# Patient Record
Sex: MALE | Race: WHITE | NOT HISPANIC OR LATINO | Employment: FULL TIME | URBAN - METROPOLITAN AREA
[De-identification: names, ages, dates, MRNs, and addresses within clinical notes are randomized per-mention and may not be internally consistent; named-entity substitution may affect disease eponyms.]

---

## 2023-04-27 LAB
ALPHA-1 FETOPROTEIN (NG/ML) IN SER/PLAS: 4 NG/ML (ref 0–9)
ANION GAP IN SER/PLAS: 11 MMOL/L (ref 10–20)
CALCIUM (MG/DL) IN SER/PLAS: 10.2 MG/DL (ref 8.6–10.6)
CARBON DIOXIDE, TOTAL (MMOL/L) IN SER/PLAS: 32 MMOL/L (ref 21–32)
CHLORIDE (MMOL/L) IN SER/PLAS: 104 MMOL/L (ref 98–107)
CREATININE (MG/DL) IN SER/PLAS: 0.86 MG/DL (ref 0.5–1.3)
ESTRADIOL (PG/ML) IN SER/PLAS: <19 PG/ML
GFR MALE: >90 ML/MIN/1.73M2
GLUCOSE (MG/DL) IN SER/PLAS: 79 MG/DL (ref 74–99)
HCG TUMOR MARKER: 4 IU/L
HEMATOCRIT (%) IN BLOOD BY AUTOMATED COUNT: 42.9 % (ref 41–52)
LUTEINIZING HORMONE (IU/ML) IN SER/PLAS: 55.5 IU/L
POTASSIUM (MMOL/L) IN SER/PLAS: 4.8 MMOL/L (ref 3.5–5.3)
PROSTATE SPECIFIC AG (NG/ML) IN SER/PLAS: 0.55 NG/ML (ref 0–4)
SODIUM (MMOL/L) IN SER/PLAS: 142 MMOL/L (ref 136–145)
TESTOSTERONE (NG/DL) IN SER/PLAS: 522 NG/DL (ref 240–1000)
UREA NITROGEN (MG/DL) IN SER/PLAS: 20 MG/DL (ref 6–23)

## 2023-05-16 LAB — CREATINE KINASE (U/L) IN SER/PLAS: 128 U/L (ref 0–325)

## 2023-05-17 LAB
ANTI-CENTROMERE: <0.2 AI
ANTI-CHROMATIN: <0.2 AI
ANTI-DNA (DS): <1 IU/ML
ANTI-JO-1 IGG: <0.2 AI
ANTI-NUCLEAR ANTIBODY (ANA): NEGATIVE
ANTI-RIBOSOMAL P: <0.2 AI
ANTI-RNP: <0.2 AI
ANTI-SCL-70: <0.2 AI
ANTI-SM/RNP: <0.2 AI
ANTI-SM: <0.2 AI
ANTI-SSA: <0.2 AI
ANTI-SSB: 0.2 AI

## 2023-05-19 LAB — ALDOLASE SERUM: 2.7 U/L (ref 1.2–7.6)

## 2023-09-01 LAB
ALANINE AMINOTRANSFERASE (SGPT) (U/L) IN SER/PLAS: 14 U/L (ref 10–52)
ALBUMIN (G/DL) IN SER/PLAS: 4.9 G/DL (ref 3.4–5)
ALKALINE PHOSPHATASE (U/L) IN SER/PLAS: 67 U/L (ref 33–120)
ANION GAP IN SER/PLAS: 15 MMOL/L (ref 10–20)
ASPARTATE AMINOTRANSFERASE (SGOT) (U/L) IN SER/PLAS: 19 U/L (ref 9–39)
BILIRUBIN TOTAL (MG/DL) IN SER/PLAS: 0.4 MG/DL (ref 0–1.2)
CALCIUM (MG/DL) IN SER/PLAS: 10.4 MG/DL (ref 8.6–10.6)
CARBON DIOXIDE, TOTAL (MMOL/L) IN SER/PLAS: 26 MMOL/L (ref 21–32)
CHLORIDE (MMOL/L) IN SER/PLAS: 106 MMOL/L (ref 98–107)
CREATININE (MG/DL) IN SER/PLAS: 0.94 MG/DL (ref 0.5–1.3)
GFR MALE: >90 ML/MIN/1.73M2
GLUCOSE (MG/DL) IN SER/PLAS: 88 MG/DL (ref 74–99)
POTASSIUM (MMOL/L) IN SER/PLAS: 3.8 MMOL/L (ref 3.5–5.3)
PROTEIN TOTAL: 7.3 G/DL (ref 6.4–8.2)
SODIUM (MMOL/L) IN SER/PLAS: 143 MMOL/L (ref 136–145)
UREA NITROGEN (MG/DL) IN SER/PLAS: 24 MG/DL (ref 6–23)

## 2023-09-02 LAB — HEPATITIS C VIRUS AB PRESENCE IN SERUM: NONREACTIVE

## 2023-09-14 LAB
ALPHA-1 FETOPROTEIN (NG/ML) IN SER/PLAS: 5 NG/ML (ref 0–9)
HCG TUMOR MARKER: 3 IU/L

## 2023-09-29 DIAGNOSIS — C62.90 MALIGNANT NEOPLASM OF TESTICLE, UNSPECIFIED LATERALITY, UNSPECIFIED WHETHER DESCENDED OR UNDESCENDED (MULTI): Primary | ICD-10-CM

## 2023-10-23 ENCOUNTER — LAB (OUTPATIENT)
Dept: LAB | Facility: LAB | Age: 43
End: 2023-10-23
Payer: COMMERCIAL

## 2023-10-23 DIAGNOSIS — E29.1 TESTICULAR HYPOFUNCTION: Primary | ICD-10-CM

## 2023-10-23 DIAGNOSIS — C62.90 MALIGNANT NEOPLASM OF TESTICLE, UNSPECIFIED LATERALITY, UNSPECIFIED WHETHER DESCENDED OR UNDESCENDED (MULTI): ICD-10-CM

## 2023-10-23 DIAGNOSIS — E29.1 TESTICULAR HYPOFUNCTION: ICD-10-CM

## 2023-10-23 LAB
ALBUMIN SERPL BCP-MCNC: 4.6 G/DL (ref 3.4–5)
ALP SERPL-CCNC: 61 U/L (ref 33–120)
ALT SERPL W P-5'-P-CCNC: 14 U/L (ref 10–52)
ANION GAP SERPL CALC-SCNC: 13 MMOL/L (ref 10–20)
AST SERPL W P-5'-P-CCNC: 16 U/L (ref 9–39)
BILIRUB SERPL-MCNC: 0.6 MG/DL (ref 0–1.2)
BUN SERPL-MCNC: 16 MG/DL (ref 6–23)
CALCIUM SERPL-MCNC: 9.9 MG/DL (ref 8.6–10.6)
CHLORIDE SERPL-SCNC: 104 MMOL/L (ref 98–107)
CO2 SERPL-SCNC: 29 MMOL/L (ref 21–32)
CREAT SERPL-MCNC: 0.81 MG/DL (ref 0.5–1.3)
ESTRADIOL SERPL-MCNC: <19 PG/ML
GFR SERPL CREATININE-BSD FRML MDRD: >90 ML/MIN/1.73M*2
GLUCOSE SERPL-MCNC: 64 MG/DL (ref 74–99)
HCT VFR BLD AUTO: 43.4 % (ref 41–52)
LH SERPL-ACNC: 52.5 IU/L
POTASSIUM SERPL-SCNC: 4.4 MMOL/L (ref 3.5–5.3)
PROT SERPL-MCNC: 6.9 G/DL (ref 6.4–8.2)
PSA SERPL-MCNC: 0.65 NG/ML
SODIUM SERPL-SCNC: 142 MMOL/L (ref 136–145)
TESTOST SERPL-MCNC: 415 NG/DL (ref 240–1000)

## 2023-10-23 PROCEDURE — 84153 ASSAY OF PSA TOTAL: CPT

## 2023-10-23 PROCEDURE — 85014 HEMATOCRIT: CPT

## 2023-10-23 PROCEDURE — 82670 ASSAY OF TOTAL ESTRADIOL: CPT

## 2023-10-23 PROCEDURE — 82310 ASSAY OF CALCIUM: CPT | Mod: CMCLAB | Performed by: INTERNAL MEDICINE

## 2023-10-23 PROCEDURE — 36415 COLL VENOUS BLD VENIPUNCTURE: CPT

## 2023-10-23 PROCEDURE — 83002 ASSAY OF GONADOTROPIN (LH): CPT

## 2023-10-23 PROCEDURE — 84403 ASSAY OF TOTAL TESTOSTERONE: CPT

## 2023-11-28 ENCOUNTER — TELEMEDICINE (OUTPATIENT)
Dept: UROLOGY | Facility: CLINIC | Age: 43
End: 2023-11-28
Payer: COMMERCIAL

## 2023-11-28 DIAGNOSIS — E29.1 HYPOGONADISM IN MALE: Primary | ICD-10-CM

## 2023-11-28 DIAGNOSIS — Z12.5 PROSTATE CANCER SCREENING: ICD-10-CM

## 2023-11-28 DIAGNOSIS — R79.89 LOW TESTOSTERONE IN MALE: ICD-10-CM

## 2023-11-28 PROCEDURE — 99214 OFFICE O/P EST MOD 30 MIN: CPT | Performed by: UROLOGY

## 2023-11-28 RX ORDER — TESTOSTERONE 20.25 MG/1.25G
4 GEL TOPICAL DAILY
Qty: 150 G | Refills: 5 | Status: SHIPPED | OUTPATIENT
Start: 2023-11-28

## 2023-11-28 NOTE — PROGRESS NOTES
Virtual or Telephone Consent    An interactive audio and video telecommunication system which permits real time communications between the patient (at the originating site) and provider (at the distant site) was utilized to provide this telehealth service.   Verbal consent was requested and obtained from Miguel Moreland on this date, 11/28/23 for a telehealth visit.     HPI  43 y.o.  with hx of right testicular cancer sp right orchiectomy in distant pass as well as chemotherapy with testicular mass seen on US. sp left orchiectomy 6/29/22, presenting for hypogonadism.    Last visit 05/22/23:   -continue 2 pumps to each shoulder daily  -Discussed insurance coverage with gels and that we do not do prior auths for gels /injections  -PSA  -Kathy Ca on surveillance with Dr. Rivas    Today's visit:   #Testicular mass  sp left orchiectomy 6/29/22 in conjunction with Dr. 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 Rivas  On surveillance for now  Not interested in testicular implant    Tumor markers 6/14/22: , HCG <3, Alpha FP 4    #Hypogonadism  -Using 2 pumps each shoulder currently.   -doing well on current regimen    HISTORY: None.    Lab Results   Component Value Date    TESTOSTERONE 415 10/23/2023    TESTOSTERONE 522 04/27/2023    TESTOSTERONE 296 05/03/2021     Lab Results   Component Value Date    LH 52.5 10/23/2023     Lab Results   Component Value Date    ESTRADIOL <19 10/23/2023     Lab Results   Component Value Date    PSA 0.65 10/23/2023     Lab Results   Component Value Date    GFRMALE >90 09/01/2023     Lab Results   Component Value Date    CREATININE 0.81 10/23/2023     Lab Results   Component Value Date    HCT 43.4 10/23/2023       Current Medications:  No current outpatient medications on file.     No current facility-administered medications for this visit.        PMH:  No past medical history on file.    PSH:  Past Surgical History:   Procedure Laterality  Date    OTHER SURGICAL HISTORY  03/09/2020    Lung wedge resection    OTHER SURGICAL HISTORY  03/09/2020    Orchiectomy radical       FMH:  No family history on file.    Allergies:  Not on File    Physical Exam   CONSTITUTIONAL:        No acute distress    HEAD:        Normocephalic and atraumatic    CHEST / RESPIRATORY      no excess work of breathing, no respiratory distress,    ABDOMEN / GASTROINTESTINAL:        Abdomen nondistended      Assessment/Plan  #Hypogonadism  -continue 2 pumps to each shoulder daily, refilled for 1yr today.    #Screening for polycythemia  -Hct  -prn phlebotomy for Hct>54    #Screening for hyperestrogenemia  -E2   -arimidex if E is elevated    #Screening for prostate cancer  -PSA    #Testicular cancer sp bilateral orchiectomy  -on surveillance with Dr. Evelyn sparks in 1yr with low T FU labs       Scribe Attestation  By signing my name below, I, Mónica Romero , Scribe   attest that this documentation has been prepared under the direction and in the presence of Clarita Ewing MD.

## 2024-02-27 ENCOUNTER — LAB (OUTPATIENT)
Dept: LAB | Facility: LAB | Age: 44
End: 2024-02-27
Payer: COMMERCIAL

## 2024-02-27 DIAGNOSIS — N50.89 OTHER SPECIFIED DISORDERS OF THE MALE GENITAL ORGANS: ICD-10-CM

## 2024-02-27 DIAGNOSIS — Z12.5 PROSTATE CANCER SCREENING: ICD-10-CM

## 2024-02-27 DIAGNOSIS — E29.1 HYPOGONADISM IN MALE: ICD-10-CM

## 2024-02-27 DIAGNOSIS — C80.1 MALIGNANT (PRIMARY) NEOPLASM, UNSPECIFIED (MULTI): Primary | ICD-10-CM

## 2024-02-27 LAB
AFP SERPL-MCNC: 4 NG/ML (ref 0–9)
ALBUMIN SERPL BCP-MCNC: 4.6 G/DL (ref 3.4–5)
ALP SERPL-CCNC: 57 U/L (ref 33–120)
ALT SERPL W P-5'-P-CCNC: 13 U/L (ref 10–52)
ANION GAP SERPL CALC-SCNC: 11 MMOL/L (ref 10–20)
AST SERPL W P-5'-P-CCNC: 16 U/L (ref 9–39)
B-HCG SERPL-ACNC: 4 MIU/ML
BASOPHILS # BLD AUTO: 0.03 X10*3/UL (ref 0–0.1)
BASOPHILS NFR BLD AUTO: 0.5 %
BILIRUB SERPL-MCNC: 0.5 MG/DL (ref 0–1.2)
BUN SERPL-MCNC: 15 MG/DL (ref 6–23)
CALCIUM SERPL-MCNC: 9.9 MG/DL (ref 8.6–10.6)
CHLORIDE SERPL-SCNC: 104 MMOL/L (ref 98–107)
CO2 SERPL-SCNC: 30 MMOL/L (ref 21–32)
CREAT SERPL-MCNC: 0.78 MG/DL (ref 0.5–1.3)
EGFRCR SERPLBLD CKD-EPI 2021: >90 ML/MIN/1.73M*2
EOSINOPHIL # BLD AUTO: 0.06 X10*3/UL (ref 0–0.7)
EOSINOPHIL NFR BLD AUTO: 0.9 %
ERYTHROCYTE [DISTWIDTH] IN BLOOD BY AUTOMATED COUNT: 12.8 % (ref 11.5–14.5)
ESTRADIOL SERPL-MCNC: 23 PG/ML
GLUCOSE SERPL-MCNC: 87 MG/DL (ref 74–99)
HCT VFR BLD AUTO: 41 % (ref 41–52)
HGB BLD-MCNC: 13.8 G/DL (ref 13.5–17.5)
IMM GRANULOCYTES # BLD AUTO: 0.01 X10*3/UL (ref 0–0.7)
IMM GRANULOCYTES NFR BLD AUTO: 0.2 % (ref 0–0.9)
LDH SERPL L TO P-CCNC: 124 U/L (ref 84–246)
LH SERPL-ACNC: 45 IU/L
LYMPHOCYTES # BLD AUTO: 2.05 X10*3/UL (ref 1.2–4.8)
LYMPHOCYTES NFR BLD AUTO: 32.2 %
MCH RBC QN AUTO: 30.2 PG (ref 26–34)
MCHC RBC AUTO-ENTMCNC: 33.7 G/DL (ref 32–36)
MCV RBC AUTO: 90 FL (ref 80–100)
MONOCYTES # BLD AUTO: 0.48 X10*3/UL (ref 0.1–1)
MONOCYTES NFR BLD AUTO: 7.5 %
NEUTROPHILS # BLD AUTO: 3.73 X10*3/UL (ref 1.2–7.7)
NEUTROPHILS NFR BLD AUTO: 58.7 %
NRBC BLD-RTO: 0 /100 WBCS (ref 0–0)
PLATELET # BLD AUTO: 223 X10*3/UL (ref 150–450)
POTASSIUM SERPL-SCNC: 4.1 MMOL/L (ref 3.5–5.3)
PROT SERPL-MCNC: 6.7 G/DL (ref 6.4–8.2)
PSA SERPL-MCNC: 0.73 NG/ML
RBC # BLD AUTO: 4.57 X10*6/UL (ref 4.5–5.9)
SODIUM SERPL-SCNC: 141 MMOL/L (ref 136–145)
TESTOST SERPL-MCNC: 242 NG/DL (ref 240–1000)
WBC # BLD AUTO: 6.4 X10*3/UL (ref 4.4–11.3)

## 2024-02-27 PROCEDURE — 83615 LACTATE (LD) (LDH) ENZYME: CPT

## 2024-02-27 PROCEDURE — 36415 COLL VENOUS BLD VENIPUNCTURE: CPT

## 2024-02-27 PROCEDURE — 80053 COMPREHEN METABOLIC PANEL: CPT

## 2024-02-27 PROCEDURE — 84702 CHORIONIC GONADOTROPIN TEST: CPT

## 2024-02-27 PROCEDURE — 84153 ASSAY OF PSA TOTAL: CPT

## 2024-02-27 PROCEDURE — 84403 ASSAY OF TOTAL TESTOSTERONE: CPT

## 2024-02-27 PROCEDURE — 83002 ASSAY OF GONADOTROPIN (LH): CPT

## 2024-02-27 PROCEDURE — 82105 ALPHA-FETOPROTEIN SERUM: CPT

## 2024-02-27 PROCEDURE — 85025 COMPLETE CBC W/AUTO DIFF WBC: CPT

## 2024-02-27 PROCEDURE — 82670 ASSAY OF TOTAL ESTRADIOL: CPT

## 2024-03-11 PROBLEM — D22.60 MELANOCYTIC NEVI OF UNSPECIFIED UPPER LIMB, INCLUDING SHOULDER: Status: ACTIVE | Noted: 2023-05-02

## 2024-03-11 PROBLEM — N50.89 TESTICULAR MASS: Status: ACTIVE | Noted: 2024-03-11

## 2024-03-11 PROBLEM — D48.5 NEOPLASM OF UNCERTAIN BEHAVIOR OF SKIN: Status: ACTIVE | Noted: 2023-05-02

## 2024-03-11 PROBLEM — D22.4 MELANOCYTIC NEVI OF SCALP AND NECK: Status: ACTIVE | Noted: 2023-05-02

## 2024-03-11 PROBLEM — D22.39 MELANOCYTIC NEVI OF OTHER PARTS OF FACE: Status: ACTIVE | Noted: 2023-05-02

## 2024-03-11 PROBLEM — L57.9 SKIN CHANGES DUE TO CHRONIC EXPOSURE TO NONIONIZING RADIATION, UNSPECIFIED: Status: ACTIVE | Noted: 2023-05-02

## 2024-03-11 PROBLEM — D22.70 MELANOCYTIC NEVI OF UNSPECIFIED LOWER LIMB, INCLUDING HIP: Status: ACTIVE | Noted: 2023-05-02

## 2024-03-11 PROBLEM — I86.1 VARICOCELE: Status: ACTIVE | Noted: 2024-03-11

## 2024-03-11 PROBLEM — L82.0 INFLAMED SEBORRHEIC KERATOSIS: Status: ACTIVE | Noted: 2017-12-04

## 2024-03-11 PROBLEM — K13.79 LESION OF PALATE: Status: ACTIVE | Noted: 2024-03-11

## 2024-03-11 PROBLEM — L73.8 OTHER SPECIFIED FOLLICULAR DISORDERS: Status: ACTIVE | Noted: 2023-05-02

## 2024-03-11 PROBLEM — K76.9 LESION OF LIVER: Status: ACTIVE | Noted: 2024-03-11

## 2024-03-11 PROBLEM — D22.5 MELANOCYTIC NEVI OF TRUNK: Status: ACTIVE | Noted: 2017-12-04

## 2024-03-11 RX ORDER — ASCORBIC ACID 500 MG
500 TABLET,CHEWABLE ORAL DAILY
COMMUNITY
Start: 2020-06-22 | End: 2024-03-12 | Stop reason: WASHOUT

## 2024-03-11 RX ORDER — ACETAMINOPHEN 325 MG/1
3 TABLET ORAL EVERY 6 HOURS
COMMUNITY
Start: 2022-09-09 | End: 2024-03-12 | Stop reason: WASHOUT

## 2024-03-11 NOTE — PROGRESS NOTES
Patient ID: Miguel Moreland is a 43 y.o. male.    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 nevus of skin - neg autoimmune work up    Subjective    HPI  Seen in follow up for his testicular cancer.   Energy and appetite okay.   Denies cough/sob.   Denies n/v/abd pain.   Denies lumps/bumps in groin.   Denies pain that is new or unusual or worsening/persistent.     Objective    BSA: 1.81 meters squared  /67   Pulse 63   Temp 36.6 °C (97.9 °F)   Resp 18   Wt 66.5 kg (146 lb 9.7 oz)   SpO2 100%   BMI 21.04 kg/m²      Physical Exam  Vitals reviewed.   Constitutional:       General: He is not in acute distress.  Eyes:      General: No scleral icterus.  Cardiovascular:      Rate and Rhythm: Normal rate and regular rhythm.      Pulses: Normal pulses.      Heart sounds: Normal heart sounds.   Pulmonary:      Effort: Pulmonary effort is normal. No respiratory distress.      Breath sounds: Normal breath sounds. No wheezing or rales.   Abdominal:      General:  Bowel sounds are normal. There is no distension.      Palpations: Abdomen is soft. There is no mass.      Tenderness: There is no abdominal tenderness. There is no guarding.      Comments: Tattoos on abdomen   (On R side pet's names)   Musculoskeletal:      Right lower leg: No edema.      Left lower leg: No edema.   Lymphadenopathy:      Cervical: No cervical adenopathy.      Right cervical: No superficial cervical adenopathy.     Left cervical: No superficial cervical adenopathy.      Upper Body:      Right upper body: No supraclavicular or axillary adenopathy.      Left upper body: No supraclavicular or axillary adenopathy.      Lower Body: No right inguinal adenopathy. No left inguinal adenopathy.      Comments: No adenopathy head/neck/axilla    Skin:     General: Skin is warm and dry.   Neurological:      Mental Status: He is alert and oriented to person, place, and time.   Psychiatric:         Mood and Affect: Mood normal.         Behavior: Behavior normal.         Performance Status:  Asymptomatic    AFP 4,  WNL, HCG 4    Lab Results   Component Value Date    PSA 0.73 02/27/2024    PSA 0.65 10/23/2023    PSA 0.55 04/27/2023     Lab Results   Component Value Date    WBC 6.4 02/27/2024    HGB 13.8 02/27/2024    HCT 41.0 02/27/2024    MCV 90 02/27/2024     02/27/2024     Lab Results   Component Value Date    GLUCOSE 87 02/27/2024    CALCIUM 9.9 02/27/2024     02/27/2024    K 4.1 02/27/2024    CO2 30 02/27/2024     02/27/2024    BUN 15 02/27/2024    CREATININE 0.78 02/27/2024     Lab Results   Component Value Date    TESTOSTERONE 242 02/27/2024     Lab Results   Component Value Date    ALT 13 02/27/2024    AST 16 02/27/2024    ALKPHOS 57 02/27/2024    BILITOT 0.5 02/27/2024      Assessment/Plan   AFP 4. LDH WNL. HCG 4.   Feeling well.   CXR today.   Follow up with Dr. Lewis in 6 mos with labs prior.   Encouraged patient to establish with a PCP for health maintenance/screenings.   He  follows with Dr. Ewing annually for hypogonadism treatment. Last appt was 11/2023.          Diagnoses and all orders for this visit:  Malignant neoplasm of testicle, unspecified laterality, unspecified whether descended or undescended (CMS/HCC)  -     XR chest 2 views; Future  -     Clinic Appointment Request Follow up; [facility] (at [facility]); Future  -     CBC and Auto Differential; Future  -     Comprehensive Metabolic Panel; Future  -     AFP tumor marker; Future  -     Human Chorionic Gonadotropin; Future  -     Lactate dehydrogenase; Future           DANTE Eric-CNP

## 2024-03-12 ENCOUNTER — HOSPITAL ENCOUNTER (OUTPATIENT)
Dept: RADIOLOGY | Facility: CLINIC | Age: 44
Discharge: HOME | End: 2024-03-12
Payer: COMMERCIAL

## 2024-03-12 ENCOUNTER — OFFICE VISIT (OUTPATIENT)
Dept: HEMATOLOGY/ONCOLOGY | Facility: CLINIC | Age: 44
End: 2024-03-12
Payer: COMMERCIAL

## 2024-03-12 VITALS
RESPIRATION RATE: 18 BRPM | HEART RATE: 63 BPM | SYSTOLIC BLOOD PRESSURE: 106 MMHG | TEMPERATURE: 97.9 F | BODY MASS INDEX: 21.04 KG/M2 | OXYGEN SATURATION: 100 % | WEIGHT: 146.61 LBS | DIASTOLIC BLOOD PRESSURE: 67 MMHG

## 2024-03-12 DIAGNOSIS — C62.90 MALIGNANT NEOPLASM OF TESTICLE, UNSPECIFIED LATERALITY, UNSPECIFIED WHETHER DESCENDED OR UNDESCENDED (MULTI): ICD-10-CM

## 2024-03-12 DIAGNOSIS — C62.90 MALIGNANT NEOPLASM OF TESTICLE, UNSPECIFIED LATERALITY, UNSPECIFIED WHETHER DESCENDED OR UNDESCENDED (MULTI): Primary | ICD-10-CM

## 2024-03-12 PROCEDURE — 99214 OFFICE O/P EST MOD 30 MIN: CPT | Performed by: NURSE PRACTITIONER

## 2024-03-12 PROCEDURE — 71046 X-RAY EXAM CHEST 2 VIEWS: CPT

## 2024-03-12 PROCEDURE — 71046 X-RAY EXAM CHEST 2 VIEWS: CPT | Performed by: RADIOLOGY

## 2024-03-12 ASSESSMENT — PAIN SCALES - GENERAL: PAINLEVEL: 0-NO PAIN

## 2024-04-30 ENCOUNTER — LAB REQUISITION (OUTPATIENT)
Dept: LAB | Facility: HOSPITAL | Age: 44
End: 2024-04-30
Payer: COMMERCIAL

## 2024-04-30 ENCOUNTER — TELEMEDICINE (OUTPATIENT)
Dept: UROLOGY | Facility: CLINIC | Age: 44
End: 2024-04-30
Payer: COMMERCIAL

## 2024-04-30 DIAGNOSIS — R10.9 FLANK PAIN: Primary | ICD-10-CM

## 2024-04-30 DIAGNOSIS — R30.0 DYSURIA: ICD-10-CM

## 2024-04-30 DIAGNOSIS — R39.15 URGENCY OF URINATION: ICD-10-CM

## 2024-04-30 PROCEDURE — 99213 OFFICE O/P EST LOW 20 MIN: CPT | Performed by: UROLOGY

## 2024-04-30 PROCEDURE — 87086 URINE CULTURE/COLONY COUNT: CPT

## 2024-04-30 PROCEDURE — G2211 COMPLEX E/M VISIT ADD ON: HCPCS | Performed by: UROLOGY

## 2024-04-30 NOTE — PROGRESS NOTES
Virtual or Telephone Consent    An interactive audio and video telecommunication system which permits real time communications between the patient (at the originating site) and provider (at the distant site) was utilized to provide this telehealth service.   Verbal consent was requested and obtained from Miguel ROMINA Moreland on this date, 04/30/24 for a telehealth visit.     HPI  43 y.o.  with hx of right testicular cancer sp right orchiectomy in distant pass as well as chemotherapy with testicular mass seen on US. sp left orchiectomy 6/29/22, presenting for hypogonadism.    Last visit 05/22/23:   -continue 2 pumps to each shoulder daily, refilled for 1yr today.  -testicular cancer on surveillance with Dr. Rivas    Today's visit:  #Flank pain / dysuria  -reports recent bothersome flank/ lower abdominal pain and pressure  -no hx of stones  -denies gross hematuria, n/v/f/c    #Testicular mass  sp left orchiectomy 6/29/22 in conjunction with Dr. 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 Rivas  On surveillance for now  Not interested in testicular implant    Tumor markers 6/14/22: , HCG <3, Alpha FP 4    #Hypogonadism  -Using 2 pumps each shoulder currently.   -doing well on current regimen    HISTORY: None.    Lab Results   Component Value Date    TESTOSTERONE 242 02/27/2024    TESTOSTERONE 415 10/23/2023    TESTOSTERONE 522 04/27/2023     Lab Results   Component Value Date    LH 45.0 02/27/2024     Lab Results   Component Value Date    ESTRADIOL 23 02/27/2024     Lab Results   Component Value Date    PSA 0.73 02/27/2024     Lab Results   Component Value Date    GFRMALE >90 09/01/2023     Lab Results   Component Value Date    CREATININE 0.78 02/27/2024     Lab Results   Component Value Date    HCT 41.0 02/27/2024       Current Medications:  Current Outpatient Medications   Medication Sig Dispense Refill    testosterone 20.25 mg/1.25 gram (1.62 %) gel in metered-dose pump  Place 4 Pump on the skin once daily. 2 pumps to each shoulder 150 g 5     No current facility-administered medications for this visit.        PMH:  No past medical history on file.    PSH:  Past Surgical History:   Procedure Laterality Date    OTHER SURGICAL HISTORY  03/09/2020    Lung wedge resection    OTHER SURGICAL HISTORY  03/09/2020    Orchiectomy radical       FMH:  No family history on file.    Allergies:  No Known Allergies    Physical Exam   CONSTITUTIONAL:        No acute distress    HEAD:        Normocephalic and atraumatic    CHEST / RESPIRATORY      no excess work of breathing, no respiratory distress,    ABDOMEN / GASTROINTESTINAL:        Abdomen nondistended      Assessment/Plan  #Hypogonadism  -continue 2 pumps to each shoulder daily, refilled for 1yr today.  Low t fu labs now    #Screening for polycythemia  -Hct  -prn phlebotomy for Hct>54    #Screening for hyperestrogenemia  -E2   -arimidex if E is elevated    #Screening for prostate cancer  -PSA    #Testicular cancer sp bilateral orchiectomy  -on surveillance with Dr. Rivas    #Flank pain / dysuria   -CT noncontrast now   Urine cx  -warning signs reviewed    fu after CT    G 2211  Visit complexity inherent to evaluation and management (E&M) associated with medical care services that serve as the continuing focal point for all needed health care services and/or with medical care services that are part of ongoing care related to a patient's single, serious condition or a complex condition.     Scribe Attestation  By signing my name below, Mónica QUEEN Scribe, attest that this documentation  has been prepared under the direction and in the presence of Clarita Ewing MD.

## 2024-05-01 DIAGNOSIS — R30.0 DYSURIA: ICD-10-CM

## 2024-05-01 DIAGNOSIS — R10.9 FLANK PAIN: ICD-10-CM

## 2024-05-02 DIAGNOSIS — R10.9 FLANK PAIN: ICD-10-CM

## 2024-05-02 DIAGNOSIS — R30.0 DYSURIA: ICD-10-CM

## 2024-05-02 LAB — BACTERIA UR CULT: NO GROWTH

## 2024-05-03 ENCOUNTER — LAB (OUTPATIENT)
Dept: LAB | Facility: LAB | Age: 44
End: 2024-05-03
Payer: COMMERCIAL

## 2024-05-03 ENCOUNTER — HOSPITAL ENCOUNTER (OUTPATIENT)
Dept: RADIOLOGY | Facility: CLINIC | Age: 44
Discharge: HOME | End: 2024-05-03
Payer: COMMERCIAL

## 2024-05-03 DIAGNOSIS — R30.0 DYSURIA: ICD-10-CM

## 2024-05-03 DIAGNOSIS — R10.9 FLANK PAIN: ICD-10-CM

## 2024-05-03 DIAGNOSIS — Z12.5 PROSTATE CANCER SCREENING: ICD-10-CM

## 2024-05-03 DIAGNOSIS — E29.1 HYPOGONADISM IN MALE: ICD-10-CM

## 2024-05-03 LAB
ESTRADIOL SERPL-MCNC: 20 PG/ML
HCT VFR BLD AUTO: 42.8 % (ref 41–52)
LH SERPL-ACNC: 54.6 IU/L
PSA SERPL-MCNC: 0.67 NG/ML
TESTOST SERPL-MCNC: 351 NG/DL (ref 240–1000)

## 2024-05-03 PROCEDURE — 83002 ASSAY OF GONADOTROPIN (LH): CPT

## 2024-05-03 PROCEDURE — 36415 COLL VENOUS BLD VENIPUNCTURE: CPT

## 2024-05-03 PROCEDURE — 84153 ASSAY OF PSA TOTAL: CPT

## 2024-05-03 PROCEDURE — 74176 CT ABD & PELVIS W/O CONTRAST: CPT

## 2024-05-03 PROCEDURE — 85014 HEMATOCRIT: CPT

## 2024-05-03 PROCEDURE — 82670 ASSAY OF TOTAL ESTRADIOL: CPT

## 2024-05-03 PROCEDURE — 84403 ASSAY OF TOTAL TESTOSTERONE: CPT

## 2024-05-03 PROCEDURE — 74176 CT ABD & PELVIS W/O CONTRAST: CPT | Performed by: RADIOLOGY

## 2024-05-06 ENCOUNTER — TELEMEDICINE (OUTPATIENT)
Dept: UROLOGY | Facility: HOSPITAL | Age: 44
End: 2024-05-06
Payer: COMMERCIAL

## 2024-05-06 DIAGNOSIS — Z12.5 PROSTATE CANCER SCREENING: ICD-10-CM

## 2024-05-06 DIAGNOSIS — R10.2 MALE PELVIC PAIN: ICD-10-CM

## 2024-05-06 DIAGNOSIS — E29.1 HYPOGONADISM IN MALE: Primary | ICD-10-CM

## 2024-05-06 PROCEDURE — 99214 OFFICE O/P EST MOD 30 MIN: CPT | Performed by: UROLOGY

## 2024-05-06 RX ORDER — TESTOSTERONE 20.25 MG/1.25G
2 GEL TOPICAL DAILY
Qty: 150 G | Refills: 5 | Status: SHIPPED | OUTPATIENT
Start: 2024-05-06 | End: 2024-05-20 | Stop reason: SDUPTHER

## 2024-05-06 NOTE — PROGRESS NOTES
Virtual or Telephone Consent    An interactive audio and video telecommunication system which permits real time communications between the patient (at the originating site) and provider (at the distant site) was utilized to provide this telehealth service.   Verbal consent was requested and obtained from Miguel Moreland on this date, 05/06/24 for a telehealth visit.     HPI  43 y.o.  with hx of right testicular cancer sp right orchiectomy in distant pass as well as chemotherapy with testicular mass seen on US. sp left orchiectomy 6/29/22, presenting for hypogonadism.    Last visit 04/30/24:   -continue 2 pumps to each shoulder daily, refilled for 1yr today.  -Low t fu labs now  -CT noncontrast now   -Urine cx  -warning signs reviewed    Today's visit:  #Flank pain / dysuria  -still having some flank/pelvic pain  -pain controlled  -No fevers/chills/nausea/vomiting  -urinating ok, no dyusria  -    CT A+P non con 5/3/24: Personally reviewed and interpreted.    IMPRESSION:  1. No acute abnormality in the abdomen or pelvis.  2. No appreciable nephroureterolithiasis or hydroureteronephrosis.  3. Postsurgical changes of prior retroperitoneal alden dissection.        Lab Results   Component Value Date    URINECULTURE No growth 04/30/2024       #Testicular mass  sp left orchiectomy 6/29/22 in conjunction with Dr. 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 Rivas  On surveillance for now  Not interested in testicular implant    Tumor markers 6/14/22: , HCG <3, Alpha FP 4    #Hypogonadism  -Using 2 pumps each shoulder currently.   -doing well on current regimen    HISTORY: None.    Lab Results   Component Value Date    TESTOSTERONE 351 05/03/2024    TESTOSTERONE 242 02/27/2024    TESTOSTERONE 415 10/23/2023     Lab Results   Component Value Date    LH 54.6 05/03/2024     Lab Results   Component Value Date    ESTRADIOL 20 05/03/2024     Lab Results   Component Value Date    PSA  0.67 05/03/2024     Lab Results   Component Value Date    GFRMALE >90 09/01/2023     Lab Results   Component Value Date    CREATININE 0.78 02/27/2024     Lab Results   Component Value Date    HCT 42.8 05/03/2024       Current Medications:  Current Outpatient Medications   Medication Sig Dispense Refill    testosterone 20.25 mg/1.25 gram (1.62 %) gel in metered-dose pump Place 4 Pump on the skin once daily. 2 pumps to each shoulder 150 g 5     No current facility-administered medications for this visit.        PMH:  No past medical history on file.    PSH:  Past Surgical History:   Procedure Laterality Date    OTHER SURGICAL HISTORY  03/09/2020    Lung wedge resection    OTHER SURGICAL HISTORY  03/09/2020    Orchiectomy radical       FMH:  No family history on file.    Allergies:  No Known Allergies    Physical Exam   CONSTITUTIONAL:        No acute distress    HEAD:        Normocephalic and atraumatic    CHEST / RESPIRATORY      no excess work of breathing, no respiratory distress,    ABDOMEN / GASTROINTESTINAL:        Abdomen nondistended      Assessment/Plan  #Hypogonadism  -continue 2 pumps to each shoulder daily, refilled for 6m today    #Screening for polycythemia  -Hct  -prn phlebotomy for Hct>54    #Screening for hyperestrogenemia  -E2   -arimidex if E is elevated    #Screening for prostate cancer  -PSA    #Testicular cancer sp bilateral orchiectomy  -on surveillance with Dr. Rivas    #Flank pain / pelvic pain  -no abnormality on imaging or on culture  -no signs of infection of systemic symptoms  -unclear etiology, discussed importance of physical exam for further evaulation  -Will have pt see Dr. Hawk for pelvic pain eval, also rec he see PCP for potential non-urologic symptoms  -warning signs reviewed    Fuv with Phillip in 6m fo low T fuv  Fu with Dr. Hawk re: pelvic pain    G 2211  Visit complexity inherent to evaluation and management (E&M) associated with medical care services that serve as the  continuing focal point for all needed health care services and/or with medical care services that are part of ongoing care related to a patient's single, serious condition or a complex condition.     Scribe Attestation  By signing my name below, I, Brook Lucero, attest that this documentation  has been prepared under the direction and in the presence of Clarita Ewing MD.

## 2024-05-13 ENCOUNTER — NURSE TRIAGE (OUTPATIENT)
Dept: HEMATOLOGY/ONCOLOGY | Facility: CLINIC | Age: 44
End: 2024-05-13
Payer: COMMERCIAL

## 2024-05-13 ENCOUNTER — TELEPHONE (OUTPATIENT)
Dept: HEMATOLOGY/ONCOLOGY | Facility: CLINIC | Age: 44
End: 2024-05-13
Payer: COMMERCIAL

## 2024-05-13 DIAGNOSIS — C62.90 MALIGNANT NEOPLASM OF TESTICLE, UNSPECIFIED LATERALITY, UNSPECIFIED WHETHER DESCENDED OR UNDESCENDED (MULTI): Primary | ICD-10-CM

## 2024-05-13 DIAGNOSIS — C62.90 MALIGNANT NEOPLASM OF TESTICLE, UNSPECIFIED LATERALITY, UNSPECIFIED WHETHER DESCENDED OR UNDESCENDED (MULTI): ICD-10-CM

## 2024-05-13 DIAGNOSIS — R07.89 STERNAL PAIN: Primary | ICD-10-CM

## 2024-05-13 DIAGNOSIS — R07.89 STERNAL PAIN: ICD-10-CM

## 2024-05-13 NOTE — PATIENT COMMUNICATION
"Per Pt [facility] MyChart message \"Kanika Pichardo, i hope you are well. I wasnt sure who to reach out too, yourself or Dr Lewis. He had mentioned to reach out if i had any feelings that seemed off. i have had some out of the ordinary feelings and pain in my sternum and left side of my chest over the past 2 weeks and think another  chest xray would be appropriate now. i gave it time to see if things felt better, but its the same feeling that hasnt gone away.   Please let me know any information you need from me or what i need to do in the mean time.     Miguel Moreland\"       I called the patient back to get more information, he states about 2 weeks ago he began developing some pain in the center of his sternum that will at times radiate towards the left of his chest wall. Rates it at a 5/10, states its pretty constant pain, denies SOB, dizziness, any cardiac issues (skipped beats, shoulder pain, etc), states he thinks he would benefit from an xray. Has tried to avoid any NSAIDS or pain medication, please advise.     "

## 2024-05-13 NOTE — TELEPHONE ENCOUNTER
The patient has been contacted and made aware that Do ordered a CT of Chest, pt wondering if this should be ordered STAT in order for him to get the scan sooner than 2 weeks, message sent back to Do to advise

## 2024-05-13 NOTE — TELEPHONE ENCOUNTER
Called and discussed   Stat CT ordered  Non specific symptoms  Cta ordered  If worsening needs further work up with primary

## 2024-05-13 NOTE — TELEPHONE ENCOUNTER
Additional Information  • Commented on: Where is the pain? Is there more than one place where you're having pain?     Center of sternum and radiatess to left side of chest  • Commented on: How long have they been going on?     2 weeks  • Commented on: What helps these problems?     Here and there will take some OTC NSAIDs  Not taking them regularly    Protocols used: Pain

## 2024-05-13 NOTE — TELEPHONE ENCOUNTER
Pt made aware that order has been changed to STAT. He was appreciative and denied further questions at this time

## 2024-05-15 ENCOUNTER — TELEPHONE (OUTPATIENT)
Dept: HEMATOLOGY/ONCOLOGY | Facility: CLINIC | Age: 44
End: 2024-05-15
Payer: COMMERCIAL

## 2024-05-15 ENCOUNTER — HOSPITAL ENCOUNTER (OUTPATIENT)
Dept: RADIOLOGY | Facility: CLINIC | Age: 44
Discharge: HOME | End: 2024-05-15
Payer: COMMERCIAL

## 2024-05-15 ENCOUNTER — APPOINTMENT (OUTPATIENT)
Dept: RADIOLOGY | Facility: CLINIC | Age: 44
End: 2024-05-15
Payer: COMMERCIAL

## 2024-05-15 DIAGNOSIS — C62.90 MALIGNANT NEOPLASM OF TESTICLE, UNSPECIFIED LATERALITY, UNSPECIFIED WHETHER DESCENDED OR UNDESCENDED (MULTI): Primary | ICD-10-CM

## 2024-05-15 DIAGNOSIS — C62.90 MALIGNANT NEOPLASM OF TESTICLE, UNSPECIFIED LATERALITY, UNSPECIFIED WHETHER DESCENDED OR UNDESCENDED (MULTI): ICD-10-CM

## 2024-05-15 PROCEDURE — 71275 CT ANGIOGRAPHY CHEST: CPT

## 2024-05-15 PROCEDURE — 2550000001 HC RX 255 CONTRASTS: Performed by: INTERNAL MEDICINE

## 2024-05-15 RX ADMIN — IOHEXOL 68 ML: 350 INJECTION, SOLUTION INTRAVENOUS at 14:37

## 2024-05-15 NOTE — TELEPHONE ENCOUNTER
Updated in detail  Updated findings  Regarding two things    Pulmonary findings  Initially AI read PE and then reviewed again with radiology  Findings more c/w pulmonary vein findings  Would be very atypical for pulmonary vein thrombosis  Could be artifact  Sternal symptoms / pleuritic pain for close to two weeks  Reviewed with team reviewed with pulmonary team  Rare to see Pulmonary vein thrombosis  Discussed options / repeat cta - limited utility of V/q vs admission vascular consult vs starting empiric anticoagulation after extensive discussion with the patient regarding all the options risk benefits and alternatives decision was made to hold off in the emergency room hold off on prophylactic anticoagulation and plan on follow-up scan in 48 hours to see if this resolves.  If worsening chest pain chest tightness shortness of breath instructed to immediately go to the emergency room.    In regards to nonspecific findings for possible recurrence of his testicular cancer the CT of the abdomen pelvis without contrast did not show any clear findings could be related to potential thickening of the duodenum and bowel changes we will get tumor markers which we will do tomorrow and again suggest that the dye load plan on CT of the abdomen pelvis on Friday with contrast and monitor.  He will contact us for any other new symptoms.

## 2024-05-15 NOTE — TELEPHONE ENCOUNTER
Received alert from radiology concern for pulmonary embolism and recurrence of retroperitoneal masses will need to be admitted to the hospital for IV heparin biopsy of masses and further evaluation and tumor markers attempted to call patient went to voicemail left message will attempt to call again

## 2024-05-15 NOTE — TELEPHONE ENCOUNTER
Miguel returned Dr. Lewis's call re: possible pulmonary embolism.  Secure Chat sent to MD.  Per Dr. Lewis, they changed the read no pulmonary embolism.   I advised Miguel that there is no embolism/this is not emergent.   Dr. Lewis will be contacting him back as well once he is available.  Patient verbalized understanding and has no further questions or concerns at this time.

## 2024-05-16 ENCOUNTER — LAB (OUTPATIENT)
Dept: LAB | Facility: LAB | Age: 44
End: 2024-05-16
Payer: COMMERCIAL

## 2024-05-16 DIAGNOSIS — C62.90 MALIGNANT NEOPLASM OF TESTICLE, UNSPECIFIED LATERALITY, UNSPECIFIED WHETHER DESCENDED OR UNDESCENDED (MULTI): ICD-10-CM

## 2024-05-16 LAB
AFP SERPL-MCNC: 4 NG/ML (ref 0–9)
ALBUMIN SERPL BCP-MCNC: 4.6 G/DL (ref 3.4–5)
ALP SERPL-CCNC: 56 U/L (ref 33–120)
ALT SERPL W P-5'-P-CCNC: 14 U/L (ref 10–52)
ANION GAP SERPL CALC-SCNC: 15 MMOL/L (ref 10–20)
AST SERPL W P-5'-P-CCNC: 16 U/L (ref 9–39)
B-HCG SERPL-ACNC: 4 MIU/ML
BASOPHILS # BLD AUTO: 0.04 X10*3/UL (ref 0–0.1)
BASOPHILS NFR BLD AUTO: 0.7 %
BILIRUB SERPL-MCNC: 0.4 MG/DL (ref 0–1.2)
BUN SERPL-MCNC: 17 MG/DL (ref 6–23)
CALCIUM SERPL-MCNC: 9.8 MG/DL (ref 8.6–10.6)
CHLORIDE SERPL-SCNC: 102 MMOL/L (ref 98–107)
CO2 SERPL-SCNC: 28 MMOL/L (ref 21–32)
CREAT SERPL-MCNC: 0.82 MG/DL (ref 0.5–1.3)
EGFRCR SERPLBLD CKD-EPI 2021: >90 ML/MIN/1.73M*2
EOSINOPHIL # BLD AUTO: 0.04 X10*3/UL (ref 0–0.7)
EOSINOPHIL NFR BLD AUTO: 0.7 %
ERYTHROCYTE [DISTWIDTH] IN BLOOD BY AUTOMATED COUNT: 13.1 % (ref 11.5–14.5)
GLUCOSE SERPL-MCNC: 78 MG/DL (ref 74–99)
HCT VFR BLD AUTO: 45.2 % (ref 41–52)
HGB BLD-MCNC: 14.7 G/DL (ref 13.5–17.5)
IMM GRANULOCYTES # BLD AUTO: 0.02 X10*3/UL (ref 0–0.7)
IMM GRANULOCYTES NFR BLD AUTO: 0.3 % (ref 0–0.9)
LDH SERPL L TO P-CCNC: 129 U/L (ref 84–246)
LYMPHOCYTES # BLD AUTO: 1.28 X10*3/UL (ref 1.2–4.8)
LYMPHOCYTES NFR BLD AUTO: 21 %
MCH RBC QN AUTO: 30.2 PG (ref 26–34)
MCHC RBC AUTO-ENTMCNC: 32.5 G/DL (ref 32–36)
MCV RBC AUTO: 93 FL (ref 80–100)
MONOCYTES # BLD AUTO: 0.47 X10*3/UL (ref 0.1–1)
MONOCYTES NFR BLD AUTO: 7.7 %
NEUTROPHILS # BLD AUTO: 4.25 X10*3/UL (ref 1.2–7.7)
NEUTROPHILS NFR BLD AUTO: 69.6 %
NRBC BLD-RTO: 0 /100 WBCS (ref 0–0)
PLATELET # BLD AUTO: 231 X10*3/UL (ref 150–450)
POTASSIUM SERPL-SCNC: 4.7 MMOL/L (ref 3.5–5.3)
PROT SERPL-MCNC: 6.9 G/DL (ref 6.4–8.2)
RBC # BLD AUTO: 4.86 X10*6/UL (ref 4.5–5.9)
SODIUM SERPL-SCNC: 140 MMOL/L (ref 136–145)
WBC # BLD AUTO: 6.1 X10*3/UL (ref 4.4–11.3)

## 2024-05-16 PROCEDURE — 80053 COMPREHEN METABOLIC PANEL: CPT

## 2024-05-16 PROCEDURE — 36415 COLL VENOUS BLD VENIPUNCTURE: CPT

## 2024-05-16 PROCEDURE — 82105 ALPHA-FETOPROTEIN SERUM: CPT

## 2024-05-16 PROCEDURE — 84702 CHORIONIC GONADOTROPIN TEST: CPT

## 2024-05-16 PROCEDURE — 85025 COMPLETE CBC W/AUTO DIFF WBC: CPT

## 2024-05-16 PROCEDURE — 83615 LACTATE (LD) (LDH) ENZYME: CPT

## 2024-05-17 ENCOUNTER — HOSPITAL ENCOUNTER (OUTPATIENT)
Dept: RADIOLOGY | Facility: CLINIC | Age: 44
Discharge: HOME | End: 2024-05-17
Payer: COMMERCIAL

## 2024-05-17 ENCOUNTER — TELEPHONE (OUTPATIENT)
Dept: HEMATOLOGY/ONCOLOGY | Facility: HOSPITAL | Age: 44
End: 2024-05-17
Payer: COMMERCIAL

## 2024-05-17 DIAGNOSIS — C62.90 MALIGNANT NEOPLASM OF TESTICLE, UNSPECIFIED LATERALITY, UNSPECIFIED WHETHER DESCENDED OR UNDESCENDED (MULTI): ICD-10-CM

## 2024-05-17 PROCEDURE — 2550000001 HC RX 255 CONTRASTS: Performed by: INTERNAL MEDICINE

## 2024-05-17 PROCEDURE — 74177 CT ABD & PELVIS W/CONTRAST: CPT | Mod: RSC

## 2024-05-17 PROCEDURE — 71275 CT ANGIOGRAPHY CHEST: CPT | Mod: RSC

## 2024-05-17 PROCEDURE — 74177 CT ABD & PELVIS W/CONTRAST: CPT | Mod: RSC | Performed by: RADIOLOGY

## 2024-05-17 PROCEDURE — 71275 CT ANGIOGRAPHY CHEST: CPT | Mod: RSC | Performed by: RADIOLOGY

## 2024-05-17 RX ADMIN — IOHEXOL 69 ML: 350 INJECTION, SOLUTION INTRAVENOUS at 16:02

## 2024-05-20 DIAGNOSIS — E29.1 HYPOGONADISM IN MALE: ICD-10-CM

## 2024-05-20 RX ORDER — TESTOSTERONE 20.25 MG/1.25G
2 GEL TOPICAL DAILY
Qty: 150 G | Refills: 5 | Status: SHIPPED | OUTPATIENT
Start: 2024-05-20

## 2024-05-21 ENCOUNTER — TUMOR BOARD CONFERENCE (OUTPATIENT)
Dept: HEMATOLOGY/ONCOLOGY | Facility: HOSPITAL | Age: 44
End: 2024-05-21
Payer: COMMERCIAL

## 2024-09-10 ENCOUNTER — APPOINTMENT (OUTPATIENT)
Dept: HEMATOLOGY/ONCOLOGY | Facility: CLINIC | Age: 44
End: 2024-09-10
Payer: COMMERCIAL

## 2024-09-12 ENCOUNTER — LAB (OUTPATIENT)
Dept: LAB | Facility: LAB | Age: 44
End: 2024-09-12
Payer: COMMERCIAL

## 2024-09-12 DIAGNOSIS — C62.90 MALIGNANT NEOPLASM OF TESTICLE, UNSPECIFIED LATERALITY, UNSPECIFIED WHETHER DESCENDED OR UNDESCENDED (MULTI): ICD-10-CM

## 2024-09-12 LAB
AFP SERPL-MCNC: 5 NG/ML (ref 0–9)
ALBUMIN SERPL BCP-MCNC: 4.7 G/DL (ref 3.4–5)
ALP SERPL-CCNC: 55 U/L (ref 33–120)
ALT SERPL W P-5'-P-CCNC: 10 U/L (ref 10–52)
ANION GAP SERPL CALC-SCNC: 14 MMOL/L (ref 10–20)
AST SERPL W P-5'-P-CCNC: 16 U/L (ref 9–39)
B-HCG SERPL-ACNC: 3 MIU/ML
BASOPHILS # BLD AUTO: 0.03 X10*3/UL (ref 0–0.1)
BASOPHILS NFR BLD AUTO: 0.5 %
BILIRUB SERPL-MCNC: 0.6 MG/DL (ref 0–1.2)
BUN SERPL-MCNC: 20 MG/DL (ref 6–23)
CALCIUM SERPL-MCNC: 9.7 MG/DL (ref 8.6–10.6)
CHLORIDE SERPL-SCNC: 103 MMOL/L (ref 98–107)
CO2 SERPL-SCNC: 29 MMOL/L (ref 21–32)
CREAT SERPL-MCNC: 0.85 MG/DL (ref 0.5–1.3)
EGFRCR SERPLBLD CKD-EPI 2021: >90 ML/MIN/1.73M*2
EOSINOPHIL # BLD AUTO: 0.06 X10*3/UL (ref 0–0.7)
EOSINOPHIL NFR BLD AUTO: 1.1 %
ERYTHROCYTE [DISTWIDTH] IN BLOOD BY AUTOMATED COUNT: 12.7 % (ref 11.5–14.5)
GLUCOSE SERPL-MCNC: 123 MG/DL (ref 74–99)
HCT VFR BLD AUTO: 44.8 % (ref 41–52)
HGB BLD-MCNC: 14.2 G/DL (ref 13.5–17.5)
IMM GRANULOCYTES # BLD AUTO: 0.01 X10*3/UL (ref 0–0.7)
IMM GRANULOCYTES NFR BLD AUTO: 0.2 % (ref 0–0.9)
LDH SERPL L TO P-CCNC: 117 U/L (ref 84–246)
LYMPHOCYTES # BLD AUTO: 1.4 X10*3/UL (ref 1.2–4.8)
LYMPHOCYTES NFR BLD AUTO: 24.8 %
MCH RBC QN AUTO: 29.6 PG (ref 26–34)
MCHC RBC AUTO-ENTMCNC: 31.7 G/DL (ref 32–36)
MCV RBC AUTO: 94 FL (ref 80–100)
MONOCYTES # BLD AUTO: 0.44 X10*3/UL (ref 0.1–1)
MONOCYTES NFR BLD AUTO: 7.8 %
NEUTROPHILS # BLD AUTO: 3.71 X10*3/UL (ref 1.2–7.7)
NEUTROPHILS NFR BLD AUTO: 65.6 %
NRBC BLD-RTO: 0 /100 WBCS (ref 0–0)
PLATELET # BLD AUTO: 188 X10*3/UL (ref 150–450)
POTASSIUM SERPL-SCNC: 4.6 MMOL/L (ref 3.5–5.3)
PROT SERPL-MCNC: 7 G/DL (ref 6.4–8.2)
RBC # BLD AUTO: 4.79 X10*6/UL (ref 4.5–5.9)
SODIUM SERPL-SCNC: 141 MMOL/L (ref 136–145)
WBC # BLD AUTO: 5.7 X10*3/UL (ref 4.4–11.3)

## 2024-09-12 PROCEDURE — 83615 LACTATE (LD) (LDH) ENZYME: CPT

## 2024-09-12 PROCEDURE — 84702 CHORIONIC GONADOTROPIN TEST: CPT

## 2024-09-12 PROCEDURE — 85025 COMPLETE CBC W/AUTO DIFF WBC: CPT

## 2024-09-12 PROCEDURE — 36415 COLL VENOUS BLD VENIPUNCTURE: CPT

## 2024-09-12 PROCEDURE — 82105 ALPHA-FETOPROTEIN SERUM: CPT

## 2024-09-12 PROCEDURE — 80053 COMPREHEN METABOLIC PANEL: CPT

## 2024-09-16 NOTE — PROGRESS NOTES
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 nevus of skin - neg autoimmune work up , t replacement     Interval history:  The patient presents for follow up.  He notes no other major new symptoms.  Denies any ongoing issues with abdominal pain.  Denies any nausea, vomiting, fevers, chills, easy bruising or bleeding denies any chest pain or chest tightness appetite and energy is otherwise stable.  Denies any other major new complaints.    ROS:  10-pt ROS reviewed and negative except as mentioned above.    Medications: below was reviewed and is accurate  Current Outpatient Medications   Medication Instructions    testosterone 20.25 mg/1.25 gram (1.62 %) gel in metered-dose pump 4 Pump, transdermal, Daily, 2 pumps to each shoulder    testosterone 20.25 mg/1.25 gram (1.62 %) gel in metered-dose pump 2 Pump, transdermal, Daily, 4 pumps a day, 2 pumps to each shoulder daily        Detailed family history and social history reviewed in detail and updated per epic charting and in  detail with the patient    Physical exam:  Vitals:    09/17/24 0905   BP: 110/65   Pulse: 54   Resp: 18   Temp: 35.5 °C (95.9 °F)   SpO2: 100%   Weight: 66.4 kg (146 lb 6.4 oz)     GEN: NAD, well-appearing  HEENT: no clear lesions seen  NECK: no clear masses  LYMPH: no palpable adenopathy  SKIN: no concerning new lesions  LUNGS: CTA  CV: RRR no clear R/G  ABD: Soft, NTND. No rebound or guarding.  EXT:  trace edema bilaterally   NEURO: Grossly intact. No focal deficits.  PSYCH: Normal mood and affect   exam no discrete masses well-healed surgical scars    Radiology review  Reviewed in detail personally and for detail see results in EPIC  CT angio / CT a/p 5/24 -postsurgical changes in the retroperitoneum no suspicious adenopathy scattered small stable liver cyst mild arthritic changes in the spine and SI joints no evidence of CT pulmonary embolism    Genomics Data    Laboratory review  Reviewed in detail personally and for detail see results in [facility]  Lab Results   Component Value Date    WBC 5.7 09/12/2024    HGB 14.2 09/12/2024    HCT 44.8 09/12/2024    MCV 94 09/12/2024     09/12/2024     Lab Results   Component Value Date    GLUCOSE 123 (H) 09/12/2024    CALCIUM 9.7 09/12/2024     09/12/2024    K 4.6 09/12/2024    CO2 29 09/12/2024     09/12/2024    BUN 20 09/12/2024    CREATININE 0.85 09/12/2024     Lab Results   Component Value Date    PSA 0.67 05/03/2024    PSA 0.73 02/27/2024    PSA 0.65 10/23/2023     Lab Results   Component Value Date    ALT 10 09/12/2024    AST 16 09/12/2024    ALKPHOS 55 09/12/2024    BILITOT 0.6 09/12/2024     Lab Results   Component Value Date    TESTOSTERONE 351 05/03/2024       ASSESSMENT AND PLAN     Testicular Cancer -the patient is now entering year 3 of surveillance recommendations his H&P and markers every 6 months discussed the pros and cons of annual CT scans most recently done in May 2024, his tumor markers and blood work is overall stable for now we will  continue to monitor and arrange follow-up in 6 months with labs and chest x-ray at that time he will contact us for any other new symptoms continue follow-up with his primary care and monitor for secondary malignancies and for potential cardiovascular risks after prior treatment.    Vitor Lewis MD  Senior Attending Physician/  in Medicine [employer]  [facility] / [facility]  Patient line [phone]  Fax [phone]

## 2024-09-17 ENCOUNTER — OFFICE VISIT (OUTPATIENT)
Dept: HEMATOLOGY/ONCOLOGY | Facility: CLINIC | Age: 44
End: 2024-09-17
Payer: COMMERCIAL

## 2024-09-17 VITALS
RESPIRATION RATE: 18 BRPM | SYSTOLIC BLOOD PRESSURE: 110 MMHG | BODY MASS INDEX: 21.01 KG/M2 | OXYGEN SATURATION: 100 % | DIASTOLIC BLOOD PRESSURE: 65 MMHG | TEMPERATURE: 95.9 F | HEART RATE: 54 BPM | WEIGHT: 146.4 LBS

## 2024-09-17 DIAGNOSIS — C62.90 MALIGNANT NEOPLASM OF TESTICLE, UNSPECIFIED LATERALITY, UNSPECIFIED WHETHER DESCENDED OR UNDESCENDED (MULTI): ICD-10-CM

## 2024-09-17 PROCEDURE — 1036F TOBACCO NON-USER: CPT | Performed by: INTERNAL MEDICINE

## 2024-09-17 PROCEDURE — 99214 OFFICE O/P EST MOD 30 MIN: CPT | Performed by: INTERNAL MEDICINE

## 2024-09-17 ASSESSMENT — PAIN SCALES - GENERAL: PAINLEVEL: 0-NO PAIN

## 2024-10-23 DIAGNOSIS — E29.1 HYPOGONADISM IN MALE: ICD-10-CM

## 2024-10-23 DIAGNOSIS — Z12.5 PROSTATE CANCER SCREENING: ICD-10-CM

## 2024-11-07 ENCOUNTER — PATIENT MESSAGE (OUTPATIENT)
Dept: UROLOGY | Facility: HOSPITAL | Age: 44
End: 2024-11-07
Payer: COMMERCIAL

## 2024-11-07 DIAGNOSIS — E29.1 HYPOGONADISM IN MALE: ICD-10-CM

## 2024-11-08 ENCOUNTER — LAB (OUTPATIENT)
Dept: LAB | Facility: LAB | Age: 44
End: 2024-11-08
Payer: COMMERCIAL

## 2024-11-08 DIAGNOSIS — Z12.5 PROSTATE CANCER SCREENING: ICD-10-CM

## 2024-11-08 DIAGNOSIS — E29.1 HYPOGONADISM IN MALE: ICD-10-CM

## 2024-11-08 LAB
ESTRADIOL SERPL-MCNC: 26 PG/ML
HCT VFR BLD AUTO: 43.5 % (ref 41–52)
LH SERPL-ACNC: 42.6 IU/L
PSA SERPL-MCNC: 0.61 NG/ML
TESTOST SERPL-MCNC: 601 NG/DL (ref 240–1000)

## 2024-11-08 PROCEDURE — 83002 ASSAY OF GONADOTROPIN (LH): CPT

## 2024-11-08 PROCEDURE — 84403 ASSAY OF TOTAL TESTOSTERONE: CPT

## 2024-11-08 PROCEDURE — 36415 COLL VENOUS BLD VENIPUNCTURE: CPT

## 2024-11-08 PROCEDURE — 84153 ASSAY OF PSA TOTAL: CPT

## 2024-11-08 PROCEDURE — 82670 ASSAY OF TOTAL ESTRADIOL: CPT

## 2024-11-08 PROCEDURE — 85014 HEMATOCRIT: CPT

## 2024-11-08 RX ORDER — TESTOSTERONE 20.25 MG/1.25G
2 GEL TOPICAL DAILY
Qty: 150 G | Refills: 1 | Status: SHIPPED | OUTPATIENT
Start: 2024-11-08

## 2024-12-20 NOTE — PROGRESS NOTES
Virtual or Telephone Consent    An interactive audio and video telecommunication system which permits real time communications between the patient (at the originating site) and provider (at the distant site) was utilized to provide this telehealth service.   Verbal consent was requested and obtained from Miguel VANEGAS Aniceto on this date, 12/23/24 for a telehealth visit.       HPI  44 y.o.  with hx of right testicular cancer sp right orchiectomy in distant pass as well as chemotherapy with testicular mass seen on US. sp left orchiectomy 6/29/22, presenting for hypogonadism.    Last visit:   #Hypogonadism  -continue 2 pumps to each shoulder daily, refilled for 6m today    #Testicular cancer sp bilateral orchiectomy  -on surveillance with Dr. Rivas    #Flank pain / pelvic pain  -Will have pt see Dr. Hawk for pelvic pain eval, also rec he see PCP for potential non-urologic symptoms    Fuv with Mirieme in 6m fo low T fuv  Fu with Dr. Hawk re: pelvic pain    Today's visit:  #Flank pain / dysuria  -now gone    #Hypogonadism  -Using 2 pumps each shoulder currently.   -doing well on current regimen  -no se    #Testicular mass  sp left orchiectomy 6/29/22 in conjunction with Dr. 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 Rivas  On surveillance for now  Not interested in testicular implant      Tumor markers 6/14/22: , HCG <3, Alpha FP 4    HISTORY: None.    Component      Latest Ref Rng 11/8/2024   HEMATOCRIT      41.0 - 52.0 % 43.5    Testosterone      240 - 1,000 ng/dL 601    PSA      <=4.00 ng/mL 0.61    Estradiol      pg/mL 26    LH      IU/L 42.6          Lab Results   Component Value Date    TESTOSTERONE 601 11/08/2024    TESTOSTERONE 351 05/03/2024    TESTOSTERONE 242 02/27/2024     Lab Results   Component Value Date    LH 42.6 11/08/2024     Lab Results   Component Value Date    ESTRADIOL 26 11/08/2024     Lab Results   Component Value Date    PSA 0.61 11/08/2024      Lab Results   Component Value Date    GFRMALE >90 09/01/2023     Lab Results   Component Value Date    CREATININE 0.85 09/12/2024     Lab Results   Component Value Date    HCT 43.5 11/08/2024       Current Medications:  Current Outpatient Medications   Medication Sig Dispense Refill    testosterone 20.25 mg/1.25 gram (1.62 %) gel in metered-dose pump Place 4 Pump on the skin once daily. 2 pumps to each shoulder 150 g 5    testosterone 20.25 mg/1.25 gram (1.62 %) gel in metered-dose pump Place 2 Pump on the skin once daily. 4 pumps a day, 2 pumps to each shoulder daily 150 g 1     No current facility-administered medications for this visit.        PMH:  No past medical history on file.    PSH:  Past Surgical History:   Procedure Laterality Date    OTHER SURGICAL HISTORY  03/09/2020    Lung wedge resection    OTHER SURGICAL HISTORY  03/09/2020    Orchiectomy radical       FMH:  No family history on file.    Allergies:  No Known Allergies    Physical Exam   CONSTITUTIONAL:        No acute distress    HEAD:        Normocephalic and atraumatic    CHEST / RESPIRATORY      no excess work of breathing, no respiratory distress,    ABDOMEN / GASTROINTESTINAL:        Abdomen nondistended      Assessment/Plan  #Hypogonadism  -continue 2 pumps to each shoulder daily, refilled for 6m today    #Screening for polycythemia  -Hct  -prn phlebotomy for Hct>54    #Screening for hyperestrogenemia  -E2   -arimidex if E is elevated    #Screening for prostate cancer  -PSA    #Testicular cancer sp bilateral orchiectomy  -on surveillance with Oncology      Fuv with Phillip Craven in 6m low T fuv in person    G 1100  Visit complexity inherent to evaluation and management (E&M) associated with medical care services that serve as the continuing focal point for all needed health care services and/or with medical care services that are part of ongoing care related to a patient's single, serious condition or a complex condition.     I have  personally reviewed the O[facility] report for this patient I have considered the risks of abuse, dependence, addiction and diversion. I believe that it is clinically appropriate for him to be prescribed this medication.”

## 2024-12-23 ENCOUNTER — TELEMEDICINE (OUTPATIENT)
Dept: UROLOGY | Facility: HOSPITAL | Age: 44
End: 2024-12-23
Payer: COMMERCIAL

## 2024-12-23 DIAGNOSIS — E29.1 HYPOGONADISM IN MALE: ICD-10-CM

## 2024-12-23 PROCEDURE — 99214 OFFICE O/P EST MOD 30 MIN: CPT | Performed by: UROLOGY

## 2024-12-23 PROCEDURE — G2211 COMPLEX E/M VISIT ADD ON: HCPCS | Performed by: UROLOGY

## 2024-12-23 RX ORDER — TESTOSTERONE 20.25 MG/1.25G
4 GEL TOPICAL DAILY
Qty: 150 G | Refills: 5 | Status: SHIPPED | OUTPATIENT
Start: 2024-12-23

## 2025-02-24 ENCOUNTER — LAB (OUTPATIENT)
Dept: LAB | Facility: HOSPITAL | Age: 45
End: 2025-02-24
Payer: COMMERCIAL

## 2025-02-24 ENCOUNTER — HOSPITAL ENCOUNTER (OUTPATIENT)
Dept: RADIOLOGY | Facility: CLINIC | Age: 45
Discharge: HOME | End: 2025-02-24
Payer: COMMERCIAL

## 2025-02-24 DIAGNOSIS — C62.90 MALIGNANT NEOPLASM OF TESTICLE, UNSPECIFIED LATERALITY, UNSPECIFIED WHETHER DESCENDED OR UNDESCENDED (MULTI): ICD-10-CM

## 2025-02-24 DIAGNOSIS — C62.90 MALIGNANT NEOPLASM OF UNSPECIFIED TESTIS, UNSPECIFIED WHETHER DESCENDED OR UNDESCENDED (MULTI): Primary | ICD-10-CM

## 2025-02-24 LAB
AFP SERPL-MCNC: 6 NG/ML (ref 0–9)
ALBUMIN SERPL BCP-MCNC: 5.1 G/DL (ref 3.4–5)
ALP SERPL-CCNC: 63 U/L (ref 33–120)
ALT SERPL W P-5'-P-CCNC: 12 U/L (ref 10–52)
ANION GAP SERPL CALC-SCNC: 12 MMOL/L (ref 10–20)
AST SERPL W P-5'-P-CCNC: 17 U/L (ref 9–39)
BASOPHILS # BLD AUTO: 0.04 X10*3/UL (ref 0–0.1)
BASOPHILS NFR BLD AUTO: 0.6 %
BILIRUB SERPL-MCNC: 0.6 MG/DL (ref 0–1.2)
BUN SERPL-MCNC: 17 MG/DL (ref 6–23)
CALCIUM SERPL-MCNC: 10 MG/DL (ref 8.6–10.6)
CHLORIDE SERPL-SCNC: 101 MMOL/L (ref 98–107)
CO2 SERPL-SCNC: 31 MMOL/L (ref 21–32)
CREAT SERPL-MCNC: 0.79 MG/DL (ref 0.5–1.3)
EGFRCR SERPLBLD CKD-EPI 2021: >90 ML/MIN/1.73M*2
EOSINOPHIL # BLD AUTO: 0.04 X10*3/UL (ref 0–0.7)
EOSINOPHIL NFR BLD AUTO: 0.6 %
ERYTHROCYTE [DISTWIDTH] IN BLOOD BY AUTOMATED COUNT: 12.9 % (ref 11.5–14.5)
GLUCOSE SERPL-MCNC: 88 MG/DL (ref 74–99)
HCT VFR BLD AUTO: 45.8 % (ref 41–52)
HGB BLD-MCNC: 14.9 G/DL (ref 13.5–17.5)
IMM GRANULOCYTES # BLD AUTO: 0.01 X10*3/UL (ref 0–0.7)
IMM GRANULOCYTES NFR BLD AUTO: 0.2 % (ref 0–0.9)
LDH SERPL L TO P-CCNC: 140 U/L (ref 84–246)
LYMPHOCYTES # BLD AUTO: 1.79 X10*3/UL (ref 1.2–4.8)
LYMPHOCYTES NFR BLD AUTO: 27.8 %
MCH RBC QN AUTO: 29.9 PG (ref 26–34)
MCHC RBC AUTO-ENTMCNC: 32.5 G/DL (ref 32–36)
MCV RBC AUTO: 92 FL (ref 80–100)
MONOCYTES # BLD AUTO: 0.47 X10*3/UL (ref 0.1–1)
MONOCYTES NFR BLD AUTO: 7.3 %
NEUTROPHILS # BLD AUTO: 4.09 X10*3/UL (ref 1.2–7.7)
NEUTROPHILS NFR BLD AUTO: 63.5 %
NRBC BLD-RTO: 0 /100 WBCS (ref 0–0)
PLATELET # BLD AUTO: 237 X10*3/UL (ref 150–450)
POTASSIUM SERPL-SCNC: 4 MMOL/L (ref 3.5–5.3)
PROT SERPL-MCNC: 7.6 G/DL (ref 6.4–8.2)
RBC # BLD AUTO: 4.98 X10*6/UL (ref 4.5–5.9)
SODIUM SERPL-SCNC: 140 MMOL/L (ref 136–145)
WBC # BLD AUTO: 6.4 X10*3/UL (ref 4.4–11.3)

## 2025-02-24 PROCEDURE — 85025 COMPLETE CBC W/AUTO DIFF WBC: CPT

## 2025-02-24 PROCEDURE — 83615 LACTATE (LD) (LDH) ENZYME: CPT

## 2025-02-24 PROCEDURE — 80053 COMPREHEN METABOLIC PANEL: CPT

## 2025-02-24 PROCEDURE — 71046 X-RAY EXAM CHEST 2 VIEWS: CPT | Performed by: RADIOLOGY

## 2025-02-24 PROCEDURE — 36415 COLL VENOUS BLD VENIPUNCTURE: CPT

## 2025-02-24 PROCEDURE — 82105 ALPHA-FETOPROTEIN SERUM: CPT

## 2025-02-24 PROCEDURE — 71046 X-RAY EXAM CHEST 2 VIEWS: CPT

## 2025-03-11 ENCOUNTER — LAB (OUTPATIENT)
Dept: LAB | Facility: CLINIC | Age: 45
End: 2025-03-11
Payer: COMMERCIAL

## 2025-03-11 ENCOUNTER — OFFICE VISIT (OUTPATIENT)
Dept: HEMATOLOGY/ONCOLOGY | Facility: CLINIC | Age: 45
End: 2025-03-11
Payer: COMMERCIAL

## 2025-03-11 VITALS
BODY MASS INDEX: 21.24 KG/M2 | WEIGHT: 148 LBS | DIASTOLIC BLOOD PRESSURE: 69 MMHG | SYSTOLIC BLOOD PRESSURE: 110 MMHG | HEART RATE: 67 BPM | OXYGEN SATURATION: 100 % | RESPIRATION RATE: 16 BRPM | TEMPERATURE: 97.5 F

## 2025-03-11 DIAGNOSIS — C62.90 MALIGNANT NEOPLASM OF TESTICLE, UNSPECIFIED LATERALITY, UNSPECIFIED WHETHER DESCENDED OR UNDESCENDED (MULTI): ICD-10-CM

## 2025-03-11 DIAGNOSIS — Z12.5 PROSTATE CANCER SCREENING: ICD-10-CM

## 2025-03-11 DIAGNOSIS — E29.1 HYPOGONADISM IN MALE: ICD-10-CM

## 2025-03-11 LAB
B-HCG SERPL-ACNC: 4 MIU/ML
ESTRADIOL SERPL-MCNC: 20 PG/ML
HCT VFR BLD AUTO: 43.1 % (ref 41–52)
LH SERPL-ACNC: 38.7 IU/L
PSA SERPL-MCNC: 0.57 NG/ML
TESTOST SERPL-MCNC: 344 NG/DL (ref 240–1000)

## 2025-03-11 PROCEDURE — 99214 OFFICE O/P EST MOD 30 MIN: CPT | Performed by: INTERNAL MEDICINE

## 2025-03-11 PROCEDURE — 84153 ASSAY OF PSA TOTAL: CPT

## 2025-03-11 PROCEDURE — 84702 CHORIONIC GONADOTROPIN TEST: CPT

## 2025-03-11 PROCEDURE — 85014 HEMATOCRIT: CPT

## 2025-03-11 PROCEDURE — 82670 ASSAY OF TOTAL ESTRADIOL: CPT

## 2025-03-11 PROCEDURE — 84403 ASSAY OF TOTAL TESTOSTERONE: CPT

## 2025-03-11 PROCEDURE — 36415 COLL VENOUS BLD VENIPUNCTURE: CPT

## 2025-03-11 PROCEDURE — 83002 ASSAY OF GONADOTROPIN (LH): CPT

## 2025-03-11 ASSESSMENT — PAIN SCALES - GENERAL: PAINLEVEL_OUTOF10: 0-NO PAIN

## 2025-03-11 ASSESSMENT — ENCOUNTER SYMPTOMS
LOSS OF SENSATION IN FEET: 0
OCCASIONAL FEELINGS OF UNSTEADINESS: 0
DEPRESSION: 0

## 2025-03-11 NOTE — PROGRESS NOTES
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 nevus of skin - neg autoimmune work up , t replacement     Interval history:  The patient presents for follow up.  The patient is without any other major new symptoms.  Appetite and energy is well.  Denies any major new issues with nausea, vomiting, fevers, chills, easy bruising or bleeding denies any ongoing issues with chest pain or chest tightness appetite and energy as well denies any worsening testicular pain or residual effects from surgery and/or chemotherapy.    ROS:  10-pt ROS reviewed and negative except as mentioned above.    Medications: below was reviewed and is accurate  Current Outpatient Medications   Medication Instructions    testosterone 20.25 mg/1.25 gram (1.62 %) gel in metered-dose pump 4 Pump, transdermal, Daily, 2 pumps to each shoulder        Detailed family history and social history reviewed in detail and updated per epic charting and in detail with the patient    Physical exam:  Vitals:    03/11/25  0906   BP: 110/69   Pulse: 67   Resp: 16   Temp: 36.4 °C (97.5 °F)   TempSrc: Core   SpO2: 100%   Weight: 67.1 kg (148 lb)         GEN: NAD, well-appearing  SKIN: no concerning new lesions examined in upper / lower extremity   LUNGS: CTA  CV: RRR no clear R/G  ABD: Soft, NTND. No rebound or guarding.  EXT:  trace edema bilaterally   NEURO: Grossly intact. No focal deficits.  PSYCH: Normal mood and affect   exam no concerning findings    Radiology review  Reviewed in detail personally and for detail see results in EPIC  CT angio / CT a/p 5/24 -postsurgical changes in the retroperitoneum no suspicious adenopathy scattered small stable liver cyst mild arthritic changes in the spine and SI joints no evidence of CT pulmonary embolism  Chest x-ray February 2025 pleural thickening versus small effusions stable    Genomics Data    Laboratory review  Reviewed in detail personally and for detail see results in [facility]  Lab Results   Component Value Date    WBC 6.4 02/24/2025    HGB 14.9 02/24/2025    HCT 45.8 02/24/2025    MCV 92 02/24/2025     02/24/2025     Lab Results   Component Value Date    GLUCOSE 88 02/24/2025    CALCIUM 10.0 02/24/2025     02/24/2025    K 4.0 02/24/2025    CO2 31 02/24/2025     02/24/2025    BUN 17 02/24/2025    CREATININE 0.79 02/24/2025     Lab Results   Component Value Date    PSA 0.61 11/08/2024    PSA 0.67 05/03/2024    PSA 0.73 02/27/2024     Lab Results   Component Value Date    ALT 12 02/24/2025    AST 17 02/24/2025    ALKPHOS 63 02/24/2025    BILITOT 0.6 02/24/2025     Lab Results   Component Value Date    TESTOSTERONE 601 11/08/2024       ASSESSMENT AND PLAN     Testicular Cancer -year 3 of surveillance tumor markers are stable overall chest x-ray stable nonspecific findings in the pleural effusion versus thickening, recommendations now are visits every 6 months CTs and chest x-rays as clinically indicated  Discussed the pros and cons of ongoing imaging and he did want to  get a follow-up CT scan will arrange follow-up in 6 months with labs prior and CT scan he will contact us for any other new complaints.  continue follow-up with his primary care and monitor for secondary malignancies and for potential cardiovascular risks after prior treatment.    Vitor Lewis MD  Senior Attending Physician/  in Medicine [employer] [facility] [employer]  [facility] / [facility]  Patient line [phone]  Fax [phone]

## 2025-04-29 ENCOUNTER — APPOINTMENT (OUTPATIENT)
Dept: UROLOGY | Facility: CLINIC | Age: 45
End: 2025-04-29
Payer: COMMERCIAL

## 2025-04-29 DIAGNOSIS — Z12.5 PROSTATE CANCER SCREENING: Primary | ICD-10-CM

## 2025-04-29 DIAGNOSIS — E29.1 HYPOGONADISM IN MALE: ICD-10-CM

## 2025-04-29 PROCEDURE — G2211 COMPLEX E/M VISIT ADD ON: HCPCS | Performed by: PHYSICIAN ASSISTANT

## 2025-04-29 PROCEDURE — 99214 OFFICE O/P EST MOD 30 MIN: CPT | Performed by: PHYSICIAN ASSISTANT

## 2025-04-29 RX ORDER — TESTOSTERONE 20.25 MG/1.25G
4 GEL TOPICAL DAILY
Qty: 150 G | Refills: 5 | Status: SHIPPED | OUTPATIENT
Start: 2025-04-29

## 2025-04-29 ASSESSMENT — ENCOUNTER SYMPTOMS
HEMATOLOGIC/LYMPHATIC NEGATIVE: 1
CONSTITUTIONAL NEGATIVE: 1
CARDIOVASCULAR NEGATIVE: 1
GASTROINTESTINAL NEGATIVE: 1
ENDOCRINE NEGATIVE: 1
RESPIRATORY NEGATIVE: 1
NEUROLOGICAL NEGATIVE: 1
PSYCHIATRIC NEGATIVE: 1
MUSCULOSKELETAL NEGATIVE: 1
ALLERGIC/IMMUNOLOGIC NEGATIVE: 1
EYES NEGATIVE: 1

## 2025-04-29 NOTE — PROGRESS NOTES
Subjective   Patient ID: Miguel Moreland is a 44 y.o. male who presents for No chief complaint on file..  HPI  Patient is a 44 y.o. male  with hx of right testicular cancer sp right orchiectomy in distant pass as well as chemotherapy with testicular mass seen on US. sp left orchiectomy 6/29/22, presenting for hypogonadism.     Patient doing well on testosterone gel 2 pumps on each shoulder. He denies any side effects such as shortness of breath, chest pain, lower extremity swelling or irritation of the skin.     He continues follow up with Dr mckay and Dr Lewis     No visits with results within 1 Month(s) from this visit.   Latest known visit with results is:   Lab on 03/11/2025   Component Date Value Ref Range Status    Hematocrit 03/11/2025 43.1  41.0 - 52.0 % Final    Testosterone 03/11/2025 344  240 - 1,000 ng/dL Final    Prostate Specific AG 03/11/2025 0.57  <=4.00 ng/mL Final    Estradiol 03/11/2025 20  pg/mL Final    Luteinizing Hormone 03/11/2025 38.7  IU/L Final    LH Reference Values  Follicular Phase          1.9-12.5 IU/L  Mid-Cycle                 8.7-76.3 IU/L  Luteal Phase              0.5-16.9 IU/L  Post Menopause            5.0-55.2 IU/L  Children                    0- 6.0 IU/L  Adult Male 18-70 years    1.5- 9.3 IU/L  Adult Male >70 years      3.1-34.6 IU/L    HCG, Beta-Quantitative 03/11/2025 4  <5 mIU/mL Final         Review of Systems   Constitutional: Negative.    HENT: Negative.     Eyes: Negative.    Respiratory: Negative.     Cardiovascular: Negative.    Gastrointestinal: Negative.    Endocrine: Negative.    Genitourinary: Negative.    Musculoskeletal: Negative.    Skin: Negative.    Allergic/Immunologic: Negative.    Neurological: Negative.    Hematological: Negative.    Psychiatric/Behavioral: Negative.         Objective   Physical Exam  Virtual visit  Assessment/Plan     Hypogonadism  Doing well on testosterone replacement. Refilled today.   Continue labs every 6 months.      Testicular cancer  Continue surveillance with Dr Rivas and Dr Lewis.    Follow up in 6 months virtually with prior labs.        Clem Craven PA-C 04/29/25 8:44 AM

## 2025-07-08 ENCOUNTER — DOCUMENTATION (OUTPATIENT)
Dept: HEMATOLOGY/ONCOLOGY | Facility: CLINIC | Age: 45
End: 2025-07-08
Payer: COMMERCIAL

## 2025-07-08 ENCOUNTER — TELEPHONE (OUTPATIENT)
Dept: HEMATOLOGY/ONCOLOGY | Facility: CLINIC | Age: 45
End: 2025-07-08
Payer: COMMERCIAL

## 2025-07-08 DIAGNOSIS — C62.90 MALIGNANT NEOPLASM OF TESTICLE, UNSPECIFIED LATERALITY, UNSPECIFIED WHETHER DESCENDED OR UNDESCENDED (MULTI): Primary | ICD-10-CM

## 2025-07-08 NOTE — PROGRESS NOTES
Patient aware he is scheduled for CT scan in September and to get labs prior. Prefers [place] location so [facility] labs placed. Will confirm with Dr. Lewis if every 6 month scan necessary at this point but currently plans to keep upcoming CT appointment.

## 2025-07-29 DIAGNOSIS — Z12.5 PROSTATE CANCER SCREENING: ICD-10-CM

## 2025-07-29 DIAGNOSIS — E29.1 HYPOGONADISM IN MALE: ICD-10-CM

## 2025-07-29 LAB
ERYTHROCYTE [DISTWIDTH] IN BLOOD BY AUTOMATED COUNT: 13.2 % (ref 11–15)
ESTRADIOL SERPL-MCNC: 20 PG/ML
HCT VFR BLD AUTO: 42.3 % (ref 38.5–50)
HGB BLD-MCNC: 13.6 G/DL (ref 13.2–17.1)
LH SERPL-ACNC: 40.5 MIU/ML (ref 1.5–9.3)
MCH RBC QN AUTO: 30.4 PG (ref 27–33)
MCHC RBC AUTO-ENTMCNC: 32.2 G/DL (ref 32–36)
MCV RBC AUTO: 94.4 FL (ref 80–100)
PLATELET # BLD AUTO: 208 THOUSAND/UL (ref 140–400)
PMV BLD REES-ECKER: 11.7 FL (ref 7.5–12.5)
PSA SERPL-MCNC: 0.38 NG/ML
RBC # BLD AUTO: 4.48 MILLION/UL (ref 4.2–5.8)
TESTOST SERPL-MCNC: 160 NG/DL (ref 250–827)
WBC # BLD AUTO: 5.8 THOUSAND/UL (ref 3.8–10.8)

## 2025-08-08 DIAGNOSIS — E29.1 HYPOGONADISM IN MALE: Primary | ICD-10-CM

## 2025-08-25 LAB
TESTOST FREE SERPL-MCNC: 167.5 PG/ML (ref 35–155)
TESTOST SERPL-MCNC: 936 NG/DL (ref 250–1100)

## 2025-08-26 ENCOUNTER — APPOINTMENT (OUTPATIENT)
Dept: UROLOGY | Facility: CLINIC | Age: 45
End: 2025-08-26
Payer: COMMERCIAL

## 2025-09-03 ENCOUNTER — DOCUMENTATION (OUTPATIENT)
Dept: HEMATOLOGY/ONCOLOGY | Facility: CLINIC | Age: 45
End: 2025-09-03
Payer: COMMERCIAL

## 2025-09-03 PROCEDURE — 80053 COMPREHEN METABOLIC PANEL: CPT

## 2025-09-03 PROCEDURE — 82105 ALPHA-FETOPROTEIN SERUM: CPT

## 2025-09-03 PROCEDURE — 36415 COLL VENOUS BLD VENIPUNCTURE: CPT

## 2025-09-03 PROCEDURE — 85025 COMPLETE CBC W/AUTO DIFF WBC: CPT

## 2025-09-03 PROCEDURE — 83615 LACTATE (LD) (LDH) ENZYME: CPT

## 2025-09-04 ENCOUNTER — HOSPITAL ENCOUNTER (OUTPATIENT)
Dept: RADIOLOGY | Facility: CLINIC | Age: 45
Discharge: HOME | End: 2025-09-04
Payer: COMMERCIAL

## 2025-09-04 ENCOUNTER — LAB (OUTPATIENT)
Dept: LAB | Facility: HOSPITAL | Age: 45
End: 2025-09-04
Payer: COMMERCIAL

## 2025-09-04 DIAGNOSIS — C62.90 MALIGNANT NEOPLASM OF TESTICLE, UNSPECIFIED LATERALITY, UNSPECIFIED WHETHER DESCENDED OR UNDESCENDED (MULTI): ICD-10-CM

## 2025-09-04 DIAGNOSIS — C62.90 MALIGNANT NEOPLASM OF UNSPECIFIED TESTIS, UNSPECIFIED WHETHER DESCENDED OR UNDESCENDED (MULTI): Primary | ICD-10-CM

## 2025-09-04 PROCEDURE — 71260 CT THORAX DX C+: CPT

## 2025-09-04 PROCEDURE — 71260 CT THORAX DX C+: CPT | Performed by: RADIOLOGY

## 2025-09-04 PROCEDURE — 2550000001 HC RX 255 CONTRASTS: Performed by: INTERNAL MEDICINE

## 2025-09-04 PROCEDURE — 74177 CT ABD & PELVIS W/CONTRAST: CPT | Performed by: RADIOLOGY

## 2025-09-04 RX ADMIN — IOHEXOL 68 ML: 350 INJECTION, SOLUTION INTRAVENOUS at 09:00

## 2025-09-05 ENCOUNTER — TELEPHONE (OUTPATIENT)
Dept: HEMATOLOGY/ONCOLOGY | Facility: CLINIC | Age: 45
End: 2025-09-05
Payer: COMMERCIAL

## 2025-09-05 LAB
HOLD SPECIMEN: NORMAL
HOLD SPECIMEN: NORMAL

## 2025-09-09 ENCOUNTER — APPOINTMENT (OUTPATIENT)
Dept: HEMATOLOGY/ONCOLOGY | Facility: CLINIC | Age: 45
End: 2025-09-09
Payer: COMMERCIAL

## 2025-10-30 ENCOUNTER — APPOINTMENT (OUTPATIENT)
Dept: UROLOGY | Facility: CLINIC | Age: 45
End: 2025-10-30
Payer: COMMERCIAL